# Patient Record
Sex: MALE | Race: WHITE | NOT HISPANIC OR LATINO | Employment: OTHER | URBAN - NONMETROPOLITAN AREA
[De-identification: names, ages, dates, MRNs, and addresses within clinical notes are randomized per-mention and may not be internally consistent; named-entity substitution may affect disease eponyms.]

---

## 2018-07-22 ENCOUNTER — OFFICE VISIT (OUTPATIENT)
Dept: URGENT CARE | Facility: PHYSICIAN GROUP | Age: 53
End: 2018-07-22

## 2018-07-22 VITALS
WEIGHT: 174 LBS | HEART RATE: 84 BPM | RESPIRATION RATE: 16 BRPM | TEMPERATURE: 98.7 F | OXYGEN SATURATION: 98 % | SYSTOLIC BLOOD PRESSURE: 118 MMHG | BODY MASS INDEX: 23.06 KG/M2 | DIASTOLIC BLOOD PRESSURE: 74 MMHG | HEIGHT: 73 IN

## 2018-07-22 DIAGNOSIS — S69.91XA FISH HOOK INJURY OF RIGHT THUMB, INITIAL ENCOUNTER: ICD-10-CM

## 2018-07-22 PROCEDURE — 90471 IMMUNIZATION ADMIN: CPT | Performed by: NURSE PRACTITIONER

## 2018-07-22 PROCEDURE — 90715 TDAP VACCINE 7 YRS/> IM: CPT | Performed by: NURSE PRACTITIONER

## 2018-07-22 PROCEDURE — 10121 INC&RMVL FB SUBQ TISS COMP: CPT | Performed by: NURSE PRACTITIONER

## 2018-07-22 NOTE — PATIENT INSTRUCTIONS
"Fish Hook Removal  A fish hook can cause a small cut or lesion that extends through all layers of the skin and into the subcutaneous tissue. Because of this, bacteria may get injected beneath the surface of the skin. A simple bandage (dressing) may be applied. This should be changed daily. Follow your caregiver's instructions regarding use of any antibacterial ointments.   Only take over-the-counter or prescription medicines for pain, discomfort, or fever as directed by your caregiver.  If you did not receive a tetanus shot from your caregiver because you did not recall when your last one was given, make sure to check with your physician's office and determine if one is needed. Generally for a \"dirty\" wound, you should receive a tetanus booster if you have not had one in the last five years. If you have a \"clean\" wound, you should receive a tetanus booster if you have not had one within the last ten years.  SEEK IMMEDIATE MEDICAL CARE IF:   · You develop redness, swelling, or increasing pain in the wound.  · You have a fever.  · You notice a bad smell coming from the wound or dressing.  · You notice pus or other unusual drainage coming from the wound.  Document Released: 12/15/2001 Document Revised: 03/11/2013 Document Reviewed: 07/08/2010  [employer]® Patient Information ©2014 [employer], [employer].    "

## 2018-07-24 ASSESSMENT — ENCOUNTER SYMPTOMS
CHILLS: 0
FEVER: 0
DIAPHORESIS: 0
WEAKNESS: 0
FOCAL WEAKNESS: 0
TINGLING: 0
SENSORY CHANGE: 0

## 2018-07-24 NOTE — PROGRESS NOTES
"Subjective:      Reinaldo Slater is a 53 y.o. male who presents with Foreign Body (R/ palm)            Patient comes in today with a single barbed hook from a fishhook in his right thumb.  He tried removal at home with no relief.  He is not up to date on tetanus vaccine.  Hook was relatively clean.  No bleeding or pain.          Review of Systems   Constitutional: Negative for chills, diaphoresis, fever and malaise/fatigue.   Musculoskeletal: Negative for joint pain.   Neurological: Negative for tingling, sensory change, focal weakness and weakness.     Medications, Allergies, and current problem list reviewed today in Epic     Objective:     /74   Pulse 84   Temp 37.1 °C (98.7 °F)   Resp 16   Ht 1.854 m (6' 1\")   Wt 78.9 kg (174 lb)   SpO2 98%   BMI 22.96 kg/m²      Physical Exam   Constitutional: He appears well-developed and well-nourished. No distress.   Cardiovascular: Normal rate.    Pulmonary/Chest: Effort normal.   Musculoskeletal:        Hands:  Fish hook as foreign body noted at the palmar aspect of the right thumb.  Patient cut the hook so only 3 mm is sticking out of the skin.  It was a small hook with small barbs.  Minimally TTP.  NO active bleeding.  NV intact. Sensation and ROM intact.  Cap refill < 2 seconds.  NO swelling or erythema.  Procedure: Foreign body removal  -Risks, benefits, and alternatives discussed. Risks including infection, bleeding, nerve damage, and poor cosmetic outcome  -Sterile technique throughout  -Local anesthesia with 2% lidocaine   -3 mm Incision with #11 blade at base of foreign body and extended proximally.    -Hook grasped with hemostat and easily removed.  Hook measures 6 mm with eros intact.  -Irrigated copiously with NS  -No evidence of further retained foreign body.  -Minimal bleeding with good hemostasis achieved  -The patient tolerated the procedure well     Skin: He is not diaphoretic.   Vitals reviewed.    In clinic medication: Tdap vaccine.  " Patient tolerated well.            Assessment/Plan:     1. Fish hook injury of right thumb, initial encounter  - Tdap =>6yo IM    Keep wound clean.    Follow up if any evidence of poor healing or secondary infection develop.  Patient verbalized understanding of and agreed with plan of care.

## 2018-10-01 ENCOUNTER — OFFICE VISIT (OUTPATIENT)
Dept: URGENT CARE | Facility: PHYSICIAN GROUP | Age: 53
End: 2018-10-01

## 2018-10-01 VITALS
HEART RATE: 82 BPM | WEIGHT: 177 LBS | OXYGEN SATURATION: 99 % | TEMPERATURE: 97.5 F | RESPIRATION RATE: 16 BRPM | DIASTOLIC BLOOD PRESSURE: 68 MMHG | HEIGHT: 73 IN | SYSTOLIC BLOOD PRESSURE: 112 MMHG | BODY MASS INDEX: 23.46 KG/M2

## 2018-10-01 DIAGNOSIS — R31.9 HEMATURIA, UNSPECIFIED TYPE: ICD-10-CM

## 2018-10-01 DIAGNOSIS — R21 PENILE RASH: ICD-10-CM

## 2018-10-01 LAB
APPEARANCE UR: CLEAR
BILIRUB UR STRIP-MCNC: NORMAL MG/DL
COLOR UR AUTO: YELLOW
GLUCOSE UR STRIP.AUTO-MCNC: NORMAL MG/DL
KETONES UR STRIP.AUTO-MCNC: NORMAL MG/DL
LEUKOCYTE ESTERASE UR QL STRIP.AUTO: NORMAL
NITRITE UR QL STRIP.AUTO: NORMAL
PH UR STRIP.AUTO: 5.5 [PH] (ref 5–8)
PROT UR QL STRIP: NORMAL MG/DL
RBC UR QL AUTO: NORMAL
SP GR UR STRIP.AUTO: 1.01
UROBILINOGEN UR STRIP-MCNC: NORMAL MG/DL

## 2018-10-01 PROCEDURE — 99214 OFFICE O/P EST MOD 30 MIN: CPT | Performed by: PHYSICIAN ASSISTANT

## 2018-10-01 PROCEDURE — 81002 URINALYSIS NONAUTO W/O SCOPE: CPT | Performed by: PHYSICIAN ASSISTANT

## 2018-10-01 RX ORDER — TRIAMCINOLONE ACETONIDE 5 MG/G
CREAM TOPICAL
Qty: 60 G | Refills: 0 | Status: SHIPPED | OUTPATIENT
Start: 2018-10-01 | End: 2020-05-19

## 2018-10-02 NOTE — PROGRESS NOTES
Chief Complaint   Patient presents with   • Testicle Swelling     notice it 3d ago       HISTORY OF PRESENT ILLNESS: Patient is a 53 y.o. male who presents today for the following:    Patient comes in for evaluation of a rash on the tip of his penis that started Friday morning, 3 days ago.  He denies pain but states it is very irritating.  He has sores that almost look like blisters on the tip of his penis.  He has noticed some irritation at the urethral opening but denies any penile drainage.  Patient does have a history of renal stones.  He denies back pain, abdominal pain, nausea, vomiting, and fevers.  He has been  for 13 years and does not suspect STDs.  Patient adds that he does not have health insurance and would like to keep cost down if possible.    Patient Active Problem List    Diagnosis Date Noted   • Kidney stones 10/08/2015   • Facial rash 10/08/2015   • Tobacco abuse 10/08/2015   • Rosacea 10/08/2015       Allergies:Patient has no known allergies.    Current Outpatient Prescriptions Ordered in [facility]   Medication Sig Dispense Refill   • triamcinolone acetonide (KENALOG) 0.5 % Cream Apply to affected area up to 4 times daily. Max use 14 days. 60 g 0   • minocycline (MINOCIN) 50 MG Cap TAKE ONE CAPSULE BY MOUTH TWICE DAILY 60 Cap 0   • fluticasone (FLONASE) 50 MCG/ACT nasal spray Spray 1 Spray in nose every day.     • albuterol (VENTOLIN OR PROVENTIL) 108 (90 BASE) MCG/ACT Aero Soln inhalation aerosol Inhale 2 Puffs by mouth every 6 hours as needed for Shortness of Breath. 8.5 g 3     No current [facility]-ordered facility-administered medications on file.        No past medical history on file.    Social History   Substance Use Topics   • Smoking status: Current Every Day Smoker     Packs/day: 0.50     Types: Cigarettes   • Smokeless tobacco: Never Used   • Alcohol use No       Family Status   Relation Status   • Mo Alive   • Fa Alive     Family History   Problem Relation Age of Onset   • Hyperlipidemia  "Father        Review of Systems:   Constitutional ROS: No unexpected change in weight, No weakness, No fatigue  Eye ROS: No recent significant change in vision, No eye pain, redness, discharge  Ear ROS: No drainage, No tinnitus or vertigo, No recent change in hearing  Mouth/Throat ROS: No teeth or gum problems, No bleeding gums, No tongue complaints  Neck ROS: No swollen glands, No significant pain in neck  Pulmonary ROS: No chronic cough, sputum, or hemoptysis, No dyspnea on exertion, No wheezing  Cardiovascular ROS: No diaphoresis, No edema, No palpitations  Gastrointestinal ROS: No change in bowel habits, No significant change in appetite, No nausea, vomiting, diarrhea, or constipation  Musculoskeletal/Extremities ROS: No peripheral edema, No pain, redness or swelling on the joints  Hematologic/Lymphatic ROS: No chills, No night sweats, No weight loss  Skin/Integumentary ROS: No edema, No evidence of rash, No itching      Exam:  Blood pressure 112/68, pulse 82, temperature 36.4 °C (97.5 °F), temperature source Temporal, resp. rate 16, height 1.854 m (6' 1\"), weight 80.3 kg (177 lb), SpO2 99 %.  General: Well developed, well nourished. No distress.  HEENT: Head is grossly normal.  Pulmonary: Unlabored respiratory effort.    Neurologic: Grossly nonfocal. No facial asymmetry noted.  : Penis is circumcised.  2 flesh-colored raised lesions approximately 1-2 mm in diameter on the head of the penis with crust centrally.  3-4 other flesh-colored, raised lesions approximately 1-2 mm in diameter that are not vesicular.  None of the lesions are tender to palpation.  The urethral opening is somewhat moist but without obvious drainage.  There are no lesions at the urethral opening.  No scrotal or testicular abnormalities noted.  Skin: Warm, dry, good turgor. No rashes in visible areas.   Psych: Normal mood. Alert and oriented x3. Judgment and insight is normal.    UA: Trace blood, otherwise " negative    Assessment/Plan:  Discussed with patient that while visual leave the lesions may resemble HSV, HSV is typically quite painful.  Discussed the pros and cons of viral testing both serum and viral culture from swab in the clinic, and patient would like to not do that testing at this time.  Also discussed unknown cost of GC/chlamydia testing from this clinic.  Patient states he would like to check out the local health department to see if testing is cheaper over there.  We will have patient try triamcinolone cream to see if this helps relieve his symptoms.  Also provided patient with some antibiotic ointment from the clinic to use should the symptoms persist.  Bottom line is patient understands to follow-up for any worsening or persistent symptoms.    Patient also understands to follow-up regarding the trace blood in his urine.  1. Penile rash  triamcinolone acetonide (KENALOG) 0.5 % Cream    POCT Urinalysis   2. Hematuria, unspecified type

## 2018-10-03 ENCOUNTER — OFFICE VISIT (OUTPATIENT)
Dept: MEDICAL GROUP | Facility: PHYSICIAN GROUP | Age: 53
End: 2018-10-03

## 2018-10-03 VITALS
TEMPERATURE: 98.5 F | OXYGEN SATURATION: 96 % | DIASTOLIC BLOOD PRESSURE: 78 MMHG | HEIGHT: 73 IN | HEART RATE: 78 BPM | SYSTOLIC BLOOD PRESSURE: 118 MMHG | WEIGHT: 170 LBS | RESPIRATION RATE: 16 BRPM | BODY MASS INDEX: 22.53 KG/M2

## 2018-10-03 DIAGNOSIS — R21 RASH: ICD-10-CM

## 2018-10-03 DIAGNOSIS — Z13.0 ENCOUNTER FOR SCREENING FOR HEMATOLOGIC DISORDER: ICD-10-CM

## 2018-10-03 DIAGNOSIS — Z13.6 SCREENING FOR CARDIOVASCULAR CONDITION: ICD-10-CM

## 2018-10-03 DIAGNOSIS — F17.200 NICOTINE DEPENDENCE WITH CURRENT USE: ICD-10-CM

## 2018-10-03 DIAGNOSIS — Z23 NEED FOR INFLUENZA VACCINATION: ICD-10-CM

## 2018-10-03 PROCEDURE — 90686 IIV4 VACC NO PRSV 0.5 ML IM: CPT | Performed by: NURSE PRACTITIONER

## 2018-10-03 PROCEDURE — 99214 OFFICE O/P EST MOD 30 MIN: CPT | Mod: 25 | Performed by: NURSE PRACTITIONER

## 2018-10-03 PROCEDURE — 90471 IMMUNIZATION ADMIN: CPT | Performed by: NURSE PRACTITIONER

## 2018-10-03 ASSESSMENT — PATIENT HEALTH QUESTIONNAIRE - PHQ9: CLINICAL INTERPRETATION OF PHQ2 SCORE: 0

## 2018-10-03 NOTE — PROGRESS NOTES
Chief Complaint   Patient presents with   • Rash     legs, swollen lymph nodes         This is a 53 y.o.male patient that presents today with the following: follow up rash    Rash  Pt seen a few days ago in  for vesicular rash on penis as well as irritation and redness at meatus. At the time declined testing due to cost and lack of insurance coverage. He is accompanied by wife today. Wife does not complain of s/sx's. He was given Rx for topical steroid, which has helped a bit with the irritation, but he developed redness to bilateral groin and enlarged and tender inguinal lymph nodes. However the redness in groin has resolved, but lymph nodes remain. Rash on head of penis is starting to heal and crust over, it is not painful or itchy. He would like to go ahead with testing, this was ordered for him, in addition to other routine fasting labs. He was advised to continue with the topical steroid if it is helping and he will be notified of lab results and any further actions if needed.    Nicotine dependence with current use  This is chronic. He smokes on a daily basis and understands the risks associate with tobacco use. He is not ready to quit, but will let me know when ready and ask for assistance with cessation.      Office Visit on 10/01/2018   Component Date Value   • POC Color 10/01/2018 Yellow    • POC Appearance 10/01/2018 Clear    • POC Leukocyte Esterase 10/01/2018 Neg    • POC Nitrites 10/01/2018 Neg    • POC Urobiligen 10/01/2018 Neg    • POC Protein 10/01/2018 Neg    • POC Urine PH 10/01/2018 5.5    • POC Blood 10/01/2018 Trace    • POC Specific Gravity 10/01/2018 1.010    • POC Ketones 10/01/2018 Neg    • POC Bilirubin 10/01/2018 Neg    • POC Glucose 10/01/2018 Neg          clinical course has been stable    No past medical history on file.    Past Surgical History:   Procedure Laterality Date   • SEPTOPLASTY     • SINUS WASHING         Family History   Problem Relation Age of Onset   • Hyperlipidemia  "Father        Patient has no known allergies.    Current Outpatient Prescriptions Ordered in [facility]   Medication Sig Dispense Refill   • triamcinolone acetonide (KENALOG) 0.5 % Cream Apply to affected area up to 4 times daily. Max use 14 days. 60 g 0   • minocycline (MINOCIN) 50 MG Cap TAKE ONE CAPSULE BY MOUTH TWICE DAILY 60 Cap 0   • fluticasone (FLONASE) 50 MCG/ACT nasal spray Spray 1 Spray in nose every day.     • albuterol (VENTOLIN OR PROVENTIL) 108 (90 BASE) MCG/ACT Aero Soln inhalation aerosol Inhale 2 Puffs by mouth every 6 hours as needed for Shortness of Breath. 8.5 g 3     No current [facility]-ordered facility-administered medications on file.        Constitutional ROS: No unexpected change in weight, No fatigue, No unexplained fevers, sweats, or chills  Pulmonary ROS: No chronic cough, sputum, or hemoptysis, No shortness of breath, No recent change in breathing, Positive for smoker   Cardiovascular ROS: No chest pain  Musculoskeletal/Extremities ROS: No clubbing, No peripheral edema, No pain, redness or swelling on the joints  Skin/Integumentary ROS: positive per HPI  Neurologic ROS: Normal development, No seizures, No weakness    Physical exam:  /78 (BP Location: Right arm, Patient Position: Sitting, BP Cuff Size: Adult)   Pulse 78   Temp 36.9 °C (98.5 °F) (Temporal)   Resp 16   Ht 1.854 m (6' 1\")   Wt 77.1 kg (170 lb)   SpO2 96%   BMI 22.43 kg/m²   General Appearance: middle aged male, alert, no distress, well nourished, well groomed  Skin: Skin color, texture, turgor normal. No rashes or lesions.  Lungs: negative findings: normal respiratory rate and rhythm, lungs clear to auscultation  Heart: negative. RRR without murmur, gallop, or rubs.  No ectopy.  Abdomen: Abdomen soft, non-tender. BS normal. No masses,  No organomegaly  Musculoskeletal: negative findings: no evidence of joint instability, strength normal, no deformities present  Neurologic: intact, CN 2-12 grossly intact  Genitalia: " positive findings: healing rash on head of penis, positive for bilateral inguinal lymphadenopathy    Medical decision making/discussion: will get labs include would culture. He is to follow up with me in 6-8 weeks. He can continue on steroid if it is helping with redness and irritation. Pt will get influenza immunization today    Reinaldo was seen today for rash.    Diagnoses and all orders for this visit:    Rash  -     CHLAMYDIA/GC PCR URINE OR SWAB; Future  -     RPR  -     HERPES VIRAL CULTURE  -     CBC WITH DIFFERENTIAL; Future    Nicotine dependence with current use    Need for influenza vaccination  -     Influenza Vaccine Quad Injection >3Y (PF)    Screening for cardiovascular condition  -     COMP METABOLIC PANEL; Future  -     LIPID PROFILE; Future    Encounter for screening for hematologic disorder  -     CBC WITH DIFFERENTIAL; Future          Please note that this dictation was created using voice recognition software. I have made every reasonable attempt to correct obvious errors, but I expect that there are errors of grammar and possibly content that I did not discover before finalizing the note.

## 2018-10-04 ENCOUNTER — HOSPITAL ENCOUNTER (OUTPATIENT)
Dept: LAB | Facility: MEDICAL CENTER | Age: 53
End: 2018-10-04
Attending: NURSE PRACTITIONER

## 2018-10-04 DIAGNOSIS — R21 RASH: ICD-10-CM

## 2018-10-04 DIAGNOSIS — Z13.0 ENCOUNTER FOR SCREENING FOR HEMATOLOGIC DISORDER: ICD-10-CM

## 2018-10-04 DIAGNOSIS — Z13.6 SCREENING FOR CARDIOVASCULAR CONDITION: ICD-10-CM

## 2018-10-04 LAB
ALBUMIN SERPL BCP-MCNC: 4.4 G/DL (ref 3.2–4.9)
ALBUMIN/GLOB SERPL: 1.8 G/DL
ALP SERPL-CCNC: 67 U/L (ref 30–99)
ALT SERPL-CCNC: 10 U/L (ref 2–50)
ANION GAP SERPL CALC-SCNC: 7 MMOL/L (ref 0–11.9)
AST SERPL-CCNC: 16 U/L (ref 12–45)
BASOPHILS # BLD AUTO: 0.4 % (ref 0–1.8)
BASOPHILS # BLD: 0.03 K/UL (ref 0–0.12)
BILIRUB SERPL-MCNC: 0.6 MG/DL (ref 0.1–1.5)
BUN SERPL-MCNC: 15 MG/DL (ref 8–22)
CALCIUM SERPL-MCNC: 9.5 MG/DL (ref 8.5–10.5)
CHLORIDE SERPL-SCNC: 108 MMOL/L (ref 96–112)
CHOLEST SERPL-MCNC: 138 MG/DL (ref 100–199)
CO2 SERPL-SCNC: 24 MMOL/L (ref 20–33)
CREAT SERPL-MCNC: 1.14 MG/DL (ref 0.5–1.4)
EOSINOPHIL # BLD AUTO: 0.14 K/UL (ref 0–0.51)
EOSINOPHIL NFR BLD: 2 % (ref 0–6.9)
ERYTHROCYTE [DISTWIDTH] IN BLOOD BY AUTOMATED COUNT: 42.5 FL (ref 35.9–50)
FASTING STATUS PATIENT QL REPORTED: NORMAL
GLOBULIN SER CALC-MCNC: 2.5 G/DL (ref 1.9–3.5)
GLUCOSE SERPL-MCNC: 114 MG/DL (ref 65–99)
HCT VFR BLD AUTO: 45 % (ref 42–52)
HDLC SERPL-MCNC: 37 MG/DL
HGB BLD-MCNC: 15.3 G/DL (ref 14–18)
IMM GRANULOCYTES # BLD AUTO: 0.01 K/UL (ref 0–0.11)
IMM GRANULOCYTES NFR BLD AUTO: 0.1 % (ref 0–0.9)
LDLC SERPL CALC-MCNC: 90 MG/DL
LYMPHOCYTES # BLD AUTO: 1.95 K/UL (ref 1–4.8)
LYMPHOCYTES NFR BLD: 28.3 % (ref 22–41)
MCH RBC QN AUTO: 31 PG (ref 27–33)
MCHC RBC AUTO-ENTMCNC: 34 G/DL (ref 33.7–35.3)
MCV RBC AUTO: 91.3 FL (ref 81.4–97.8)
MONOCYTES # BLD AUTO: 0.49 K/UL (ref 0–0.85)
MONOCYTES NFR BLD AUTO: 7.1 % (ref 0–13.4)
NEUTROPHILS # BLD AUTO: 4.28 K/UL (ref 1.82–7.42)
NEUTROPHILS NFR BLD: 62.1 % (ref 44–72)
NRBC # BLD AUTO: 0 K/UL
NRBC BLD-RTO: 0 /100 WBC
PLATELET # BLD AUTO: 238 K/UL (ref 164–446)
PMV BLD AUTO: 9.6 FL (ref 9–12.9)
POTASSIUM SERPL-SCNC: 4.4 MMOL/L (ref 3.6–5.5)
PROT SERPL-MCNC: 6.9 G/DL (ref 6–8.2)
RBC # BLD AUTO: 4.93 M/UL (ref 4.7–6.1)
SODIUM SERPL-SCNC: 139 MMOL/L (ref 135–145)
TRIGL SERPL-MCNC: 57 MG/DL (ref 0–149)
WBC # BLD AUTO: 6.9 K/UL (ref 4.8–10.8)

## 2018-10-04 PROCEDURE — 87491 CHLMYD TRACH DNA AMP PROBE: CPT

## 2018-10-04 PROCEDURE — 85025 COMPLETE CBC W/AUTO DIFF WBC: CPT

## 2018-10-04 PROCEDURE — 86780 TREPONEMA PALLIDUM: CPT

## 2018-10-04 PROCEDURE — 36415 COLL VENOUS BLD VENIPUNCTURE: CPT

## 2018-10-04 PROCEDURE — 87591 N.GONORRHOEAE DNA AMP PROB: CPT

## 2018-10-04 PROCEDURE — 80053 COMPREHEN METABOLIC PANEL: CPT

## 2018-10-04 PROCEDURE — 80061 LIPID PANEL: CPT

## 2018-10-04 NOTE — ASSESSMENT & PLAN NOTE
Pt seen a few days ago in  for vesicular rash on penis as well as irritation and redness at meatus. At the time declined testing due to cost and lack of insurance coverage. He is accompanied by wife today. Wife does not complain of s/sx's. He was given Rx for topical steroid, which has helped a bit with the irritation, but he developed redness to bilateral groin and enlarged and tender inguinal lymph nodes. However the redness in groin has resolved, but lymph nodes remain. Rash on head of penis is starting to heal and crust over, it is not painful or itchy. He would like to go ahead with testing, this was ordered for him, in addition to other routine fasting labs. He was advised to continue with the topical steroid if it is helping and he will be notified of lab results and any further actions if needed.

## 2018-10-04 NOTE — ASSESSMENT & PLAN NOTE
This is chronic. He smokes on a daily basis and understands the risks associate with tobacco use. He is not ready to quit, but will let me know when ready and ask for assistance with cessation.

## 2018-10-05 LAB
C TRACH DNA SPEC QL NAA+PROBE: NEGATIVE
N GONORRHOEA DNA SPEC QL NAA+PROBE: NEGATIVE
SPECIMEN SOURCE: NORMAL
TREPONEMA PALLIDUM IGG+IGM AB [PRESENCE] IN SERUM OR PLASMA BY IMMUNOASSAY: NON REACTIVE

## 2020-05-19 ENCOUNTER — OFFICE VISIT (OUTPATIENT)
Dept: URGENT CARE | Facility: PHYSICIAN GROUP | Age: 55
End: 2020-05-19

## 2020-05-19 VITALS
HEIGHT: 74 IN | RESPIRATION RATE: 16 BRPM | BODY MASS INDEX: 22.46 KG/M2 | TEMPERATURE: 98 F | HEART RATE: 78 BPM | SYSTOLIC BLOOD PRESSURE: 140 MMHG | OXYGEN SATURATION: 97 % | WEIGHT: 175 LBS | DIASTOLIC BLOOD PRESSURE: 70 MMHG

## 2020-05-19 DIAGNOSIS — S39.012A ACUTE MYOFASCIAL STRAIN OF LUMBAR REGION, INITIAL ENCOUNTER: ICD-10-CM

## 2020-05-19 PROCEDURE — 99214 OFFICE O/P EST MOD 30 MIN: CPT | Performed by: PHYSICIAN ASSISTANT

## 2020-05-19 RX ORDER — PREDNISONE 10 MG/1
TABLET ORAL
Qty: 1 QUANTITY SUFFICIENT | Refills: 0 | Status: SHIPPED | OUTPATIENT
Start: 2020-05-19

## 2020-05-19 RX ORDER — TIZANIDINE 4 MG/1
4 TABLET ORAL EVERY 6 HOURS PRN
Qty: 30 TAB | Refills: 0 | Status: SHIPPED | OUTPATIENT
Start: 2020-05-19

## 2020-05-19 ASSESSMENT — FIBROSIS 4 INDEX: FIB4 SCORE: 1.17

## 2020-05-19 NOTE — PROGRESS NOTES
Chief Complaint   Patient presents with   • Back Pain     Pt states he sneezed and immediately felt pain in lower back.       HISTORY OF PRESENT ILLNESS: Patient is a 55 y.o. male who presents today for the following:    Patient comes in for evaluation of low back pain on the right side started a few days ago.  He states he sneezed and felt immediate pain in his right low back.  He reports mild numbness and aching on the posterior lateral aspect of his right upper leg but denies any saddle anesthesia, bowel/bladder incontinence, and extremity weakness.  He reports history of the same years ago.  He has been taking some ibuprofen with little bit of relief.  He has worsening pain when trying to stand up straight from a seated position.    Patient Active Problem List    Diagnosis Date Noted   • Rash 10/03/2018   • Kidney stones 10/08/2015   • Facial rash 10/08/2015   • Nicotine dependence with current use 10/08/2015   • Rosacea 10/08/2015       Allergies:Patient has no known allergies.    Current Outpatient Medications Ordered in Epic   Medication Sig Dispense Refill   • predniSONE (DELTASONE) 10 MG Tab 50 mg x 1 day; 40 mg x 1 day; 30 mg x 1 day; 20 mg x 1 day; 10 mg x 1 day 1 Quantity Sufficient 0   • tizanidine (ZANAFLEX) 4 MG Tab Take 1 Tab by mouth every 6 hours as needed (pain). MUSCLE RELAXER 30 Tab 0     No current Epic-ordered facility-administered medications on file.        History reviewed. No pertinent past medical history.    Social History     Tobacco Use   • Smoking status: Current Every Day Smoker     Packs/day: 0.50     Types: Cigarettes   • Smokeless tobacco: Never Used   Substance Use Topics   • Alcohol use: No   • Drug use: Not on file     Comment: Former addict       Family Status   Relation Name Status   • Mo  Alive   • Fa  Alive     Family History   Problem Relation Age of Onset   • Hyperlipidemia Father        Review of Systems:   Constitutional ROS: No unexpected change in weight, No weakness,  "No fatigue  Pulmonary ROS: No chronic cough, sputum, or hemoptysis, No dyspnea on exertion, No wheezing  Cardiovascular ROS: No diaphoresis, No edema, No palpitations  Musculoskeletal/Extremities ROS: Right-sided low back pain.  Hematologic/Lymphatic ROS: No chills, No night sweats, No weight loss  Skin/Integumentary ROS: No edema, No evidence of rash, No itching      Exam:  /70   Pulse 78   Temp 36.7 °C (98 °F) (Temporal)   Resp 16   Ht 1.88 m (6' 2\")   Wt 79.4 kg (175 lb)   SpO2 97%   General: Well developed, well nourished. No distress.    HENT: Head is grossly normal.  Pulmonary: Unlabored respiratory effort.   Neurologic: Grossly nonfocal. No facial asymmetry noted.  Musculoskeletal: No localized tenderness noted in the low back.  No motor or sensory deficit noted in the lower extremities.  Strong plantar flexion/dorsiflexion.  Skin: Warm, dry, good turgor. No rashes in visible areas.   Psych: Normal mood. Alert and oriented to person, place and time.    Assessment/Plan:  No spinal cord compromise noted on exam.  Use all medication as directed.  Discussed red flags and ER precautions.  Discussed appropriate over-the-counter symptomatic medication, and when to return to clinic. Follow up for worsening or persistent symptoms.  1. Acute myofascial strain of lumbar region, initial encounter  predniSONE (DELTASONE) 10 MG Tab    tizanidine (ZANAFLEX) 4 MG Tab       "

## 2020-06-10 DIAGNOSIS — G89.29 CHRONIC BACK PAIN, UNSPECIFIED BACK LOCATION, UNSPECIFIED BACK PAIN LATERALITY: ICD-10-CM

## 2020-06-10 DIAGNOSIS — M54.9 CHRONIC BACK PAIN, UNSPECIFIED BACK LOCATION, UNSPECIFIED BACK PAIN LATERALITY: ICD-10-CM

## 2020-09-08 ENCOUNTER — OFFICE VISIT (OUTPATIENT)
Dept: URGENT CARE | Facility: PHYSICIAN GROUP | Age: 55
End: 2020-09-08

## 2020-09-08 ENCOUNTER — APPOINTMENT (OUTPATIENT)
Dept: RADIOLOGY | Facility: IMAGING CENTER | Age: 55
End: 2020-09-08
Attending: PHYSICIAN ASSISTANT

## 2020-09-08 VITALS
OXYGEN SATURATION: 97 % | RESPIRATION RATE: 16 BRPM | TEMPERATURE: 98.4 F | DIASTOLIC BLOOD PRESSURE: 88 MMHG | HEART RATE: 54 BPM | BODY MASS INDEX: 22.72 KG/M2 | HEIGHT: 74 IN | SYSTOLIC BLOOD PRESSURE: 138 MMHG | WEIGHT: 177 LBS

## 2020-09-08 DIAGNOSIS — R07.81 RIB PAIN: ICD-10-CM

## 2020-09-08 DIAGNOSIS — S20.212A RIB CONTUSION, LEFT, INITIAL ENCOUNTER: ICD-10-CM

## 2020-09-08 PROCEDURE — 71101 X-RAY EXAM UNILAT RIBS/CHEST: CPT | Mod: TC,FY | Performed by: PHYSICIAN ASSISTANT

## 2020-09-08 PROCEDURE — 99214 OFFICE O/P EST MOD 30 MIN: CPT | Performed by: PHYSICIAN ASSISTANT

## 2020-09-08 ASSESSMENT — ENCOUNTER SYMPTOMS
NECK PAIN: 0
FEVER: 0
CHILLS: 0
SENSORY CHANGE: 0
WHEEZING: 0
HEMOPTYSIS: 0
FOCAL WEAKNESS: 0
DIARRHEA: 0
FALLS: 0
BACK PAIN: 0
NAUSEA: 0
SHORTNESS OF BREATH: 0
VOMITING: 0
TINGLING: 0
COUGH: 0
ABDOMINAL PAIN: 0

## 2020-09-08 ASSESSMENT — FIBROSIS 4 INDEX: FIB4 SCORE: 1.17

## 2020-09-08 NOTE — PROGRESS NOTES
Subjective:   Reinaldo Slater is a 55 y.o. male who presents for Rib Injury (Pt states he slipped and fell onto his ribs, states he is now having pain with deep inhalation/movement. DOI [id number])      Rib Injury  Pertinent negatives include no abdominal pain, chest pain, chills, coughing, fever, nausea, neck pain or vomiting.   Patient is a 55-year-old male who presents with left lower rib injury on 09/03/2020.  He states he was standing on the tire of his truck and slipped and his left lower ribs struck the edge of the truck bed.  He sustained very mild and minimal pain immediately after.  Pain had been gradually worsening over the weekend.  Pain is worse with movement and sudden coughing or sneezing.  He states he is able to take a slow deep breaths without pain.  He has tried some ice and ibuprofen with minimal relief.  Laying down and certain positions exacerbate his pain as well.  He reports of mild improvement of his pain today.  He otherwise denies any other symptoms.  Denies any fevers, chills, coughing, hemoptysis, shortness of breath, wheezes, chest pain, abdominal pain, nausea, vomiting, hematuria, bruising, back pain.  Denies any other injuries.  He has no other complaints or concerns.    Review of Systems   Constitutional: Negative for chills and fever.   HENT: Negative.    Respiratory: Negative for cough, hemoptysis, shortness of breath and wheezing.    Cardiovascular: Negative for chest pain.   Gastrointestinal: Negative for abdominal pain, diarrhea, nausea and vomiting.   Genitourinary: Negative for hematuria.   Musculoskeletal: Negative for back pain, falls and neck pain.        Left lower rib pain.   Neurological: Negative for tingling, sensory change and focal weakness.       Medications:    • predniSONE Tabs  • tizanidine Tabs    Allergies: Patient has no known allergies.    Problem List: Reinaldo Slater has Kidney stones; Facial rash; Nicotine dependence with current use;  "Rosacea; and Rash on their problem list.    Surgical History:  Past Surgical History:   Procedure Laterality Date   • SEPTOPLASTY     • SINUS WASHING         Past Social Hx: Reinaldo Slater  reports that he has been smoking cigarettes. He has been smoking about 0.50 packs per day. He has never used smokeless tobacco. He reports that he does not drink alcohol.     Past Family Hx:  Reinaldo Slater family history includes Hyperlipidemia in his father.     Problem list, medications, and allergies reviewed by myself today in Epic.     Objective:     /88   Pulse (!) 54   Temp 36.9 °C (98.4 °F) (Temporal)   Resp 16   Ht 1.88 m (6' 2\")   Wt 80.3 kg (177 lb)   SpO2 97%   BMI 22.73 kg/m²     Physical Exam  Vitals signs reviewed.   Constitutional:       General: He is not in acute distress.     Appearance: Normal appearance. He is not ill-appearing or toxic-appearing.   HENT:      Head: Normocephalic and atraumatic.   Eyes:      Conjunctiva/sclera: Conjunctivae normal.      Pupils: Pupils are equal, round, and reactive to light.   Cardiovascular:      Rate and Rhythm: Normal rate and regular rhythm.      Heart sounds: Normal heart sounds.   Pulmonary:      Effort: Pulmonary effort is normal. No respiratory distress.      Breath sounds: Normal breath sounds. No wheezing, rhonchi or rales.   Abdominal:      General: Abdomen is flat. Bowel sounds are normal. There is no distension.      Palpations: Abdomen is soft. There is no splenomegaly or mass.      Tenderness: There is no abdominal tenderness. There is no right CVA tenderness, left CVA tenderness, guarding or rebound. Negative signs include Smith's sign, Rovsing's sign and McBurney's sign.      Comments: Negative ecchymosis.   Musculoskeletal:        Arms:       Comments: Area above: Tenderness to palpation focally to anterior lateral left lower ribs.   Negative crepitus  Negative erythema or ecchymosis  Negative deformity     Skin:     " General: Skin is warm and dry.      Findings: No bruising.   Neurological:      General: No focal deficit present.      Mental Status: He is alert and oriented to person, place, and time.   Psychiatric:         Mood and Affect: Mood normal.         Behavior: Behavior normal.       DX: Left Ribs with 1 view chest   COMPARISON: NONE     FINDINGS:  LUNGS: The lungs are clear.     HEART and MEDIASTINUM: Heart and mediastinum: normal in size.     Pleura: There are no pleural effusion or pneumothoraces.     Osseous structures: No significant bony abnormality.        IMPRESSION:     No cardiopulmonary abnormality or left rib fracture identified    Assessment/Plan:     Diagnosis and associated orders:     1. Rib contusion, left, initial encounter  [id number] (WITH 1-VIEW CXR) LEFT      Comments/MDM:     • Results per radiologist interpretation above. I agree with radiologist.   • Discussed with patient most likely a rib contusion and/or chest wall strain.   • Will treat similar to rib fracture.   • Deep breathing exercises, rest, elevation, ice application, ibuprofen 600 to 800 mg every 8 hours alternating with Tylenol, avoid exacerbating factors, positional changes, and avoid strenuous physical activity.       Red flags discussed and indications to immediately call 911 or present to the Emergency Department.   Supportive care, differential diagnoses, and indications for immediate follow-up discussed with patient.    Pathogenesis of diagnosis discussed including typical length and natural progression. Patient expresses understanding and agrees to plan.    Advised the patient to follow-up with the primary care physician for recheck, reevaluation, and consideration of further management.    Please note that this dictation was created using voice recognition software. I have made a reasonable attempt to correct obvious errors, but I expect that there are errors of grammar and possibly content that I did not discover  before finalizing the note.    This note was electronically signed by Prasanth Cardona PA-C

## 2020-09-09 NOTE — PATIENT INSTRUCTIONS
Rib Contusion  A rib contusion is a deep bruise on your rib area. Contusions are the result of a blunt trauma that causes bleeding and injury to the tissues under the skin. A rib contusion may involve bruising of the ribs and of the skin and muscles in the area. The skin over the contusion may turn blue, purple, or yellow. Minor injuries will give you a painless contusion. More severe contusions may stay painful and swollen for a few weeks.  What are the causes?  This condition is usually caused by a blow, trauma, or direct force to an area of the body. This often occurs while playing contact sports.  What are the signs or symptoms?  Symptoms of this condition include:  · Swelling and redness of the injured area.  · Discoloration of the injured area.  · Tenderness and soreness of the injured area.  · Pain with or without movement.  How is this diagnosed?  This condition may be diagnosed based on:  · Your symptoms and medical history.  · A physical exam.  · Imaging tests--such as an X-ray, CT scan, or MRI--to determine if there were internal injuries or broken bones (fractures).  How is this treated?  This condition may be treated with:  · Rest. This is often the best treatment for a rib contusion.  · Icing. This reduces swelling and inflammation.  · Deep-breathing exercises. These may be recommended to reduce the risk for lung collapse and pneumonia.  · Medicines. Over-the-counter or prescription medicines may be given to control pain.  · Injection of a numbing medicine around the nerve near your injury (nerve block).  Follow these instructions at home:         Medicines  · Take over-the-counter and prescription medicines only as told by your health care provider.  · Do not drive or use heavy machinery while taking prescription pain medicine.  · If you are taking prescription pain medicine, take actions to prevent or treat constipation. Your health care provider may recommend that you:  ? Drink enough fluid to keep  your urine pale yellow.  ? Eat foods that are high in fiber, such as fresh fruits and vegetables, whole grains, and beans.  ? Limit foods that are high in fat and processed sugars, such as fried or sweet foods.  ? Take an over-the-counter or prescription medicine for constipation.  Managing pain, stiffness, and swelling  · If directed, put ice on the injured area:  ? Put ice in a plastic bag.  ? Place a towel between your skin and the bag.  ? Leave the ice on for 20 minutes, 2-3 times a day.  · Rest the injured area. Avoid strenuous activity and any activities or movements that cause pain. Be careful during activities and avoid bumping the injured area.  · Do not lift anything that is heavier than 5 lb (2.3 kg), or the limit that you are told, until your health care provider says that it is safe.  General instructions  · Do not use any products that contain nicotine or tobacco, such as cigarettes and e-cigarettes. These can delay healing. If you need help quitting, ask your health care provider.  · Do deep-breathing exercises as told by your health care provider.  · If you were given an incentive spirometer, use it every 1-2 hours while you are awake, or as recommended by your health care provider. This device measures how well you are filling your lungs with each breath.  · Keep all follow-up visits as told by your health care provider. This is important.  Contact a health care provider if you have:  · Increased bruising or swelling.  · Pain that is not controlled with treatment.  · A fever.  Get help right away if you:  · Have difficulty breathing or shortness of breath.  · Develop a continual cough or you cough up thick or bloody sputum.  · Feel nauseous or you vomit.  · Have pain in your abdomen.  Summary  · A rib contusion is a deep bruise on your rib area. Contusions are the result of a blunt trauma that causes bleeding and injury to the tissues under the skin.  · The skin overlying the contusion may turn  blue, purple, or yellow. Minor injuries may give you a painless contusion. More severe contusions may stay painful and swollen for a few weeks.  · Rest the injured area. Avoid strenuous activity and any activities or movements that cause pain.  This information is not intended to replace advice given to you by your health care provider. Make sure you discuss any questions you have with your health care provider.  Document Released: 09/12/2002 Document Revised: 01/16/2019 Document Reviewed: 01/16/2019  Elsevier Patient Education © 2020 [employer]

## 2025-02-03 ENCOUNTER — OFFICE VISIT (OUTPATIENT)
Dept: URGENT CARE | Facility: PHYSICIAN GROUP | Age: 60
End: 2025-02-03

## 2025-02-03 ENCOUNTER — HOSPITAL ENCOUNTER (OUTPATIENT)
Facility: MEDICAL CENTER | Age: 60
End: 2025-02-03
Attending: FAMILY MEDICINE

## 2025-02-03 VITALS
WEIGHT: 180 LBS | TEMPERATURE: 97.5 F | SYSTOLIC BLOOD PRESSURE: 130 MMHG | DIASTOLIC BLOOD PRESSURE: 84 MMHG | RESPIRATION RATE: 14 BRPM | OXYGEN SATURATION: 97 % | HEART RATE: 98 BPM | BODY MASS INDEX: 23.11 KG/M2

## 2025-02-03 DIAGNOSIS — N48.9 PENILE LESION: ICD-10-CM

## 2025-02-03 LAB — AMBIGUOUS DTTM AMBI4: NORMAL

## 2025-02-03 PROCEDURE — 99204 OFFICE O/P NEW MOD 45 MIN: CPT | Performed by: FAMILY MEDICINE

## 2025-02-03 PROCEDURE — 3079F DIAST BP 80-89 MM HG: CPT | Performed by: FAMILY MEDICINE

## 2025-02-03 PROCEDURE — 87798 DETECT AGENT NOS DNA AMP: CPT

## 2025-02-03 PROCEDURE — 87529 HSV DNA AMP PROBE: CPT | Mod: 91

## 2025-02-03 PROCEDURE — 3075F SYST BP GE 130 - 139MM HG: CPT | Performed by: FAMILY MEDICINE

## 2025-02-04 ENCOUNTER — HOSPITAL ENCOUNTER (OUTPATIENT)
Dept: LAB | Facility: MEDICAL CENTER | Age: 60
End: 2025-02-04
Attending: FAMILY MEDICINE

## 2025-02-04 DIAGNOSIS — N48.9 PENILE LESION: ICD-10-CM

## 2025-02-04 PROCEDURE — 86780 TREPONEMA PALLIDUM: CPT

## 2025-02-04 PROCEDURE — 36415 COLL VENOUS BLD VENIPUNCTURE: CPT

## 2025-02-04 NOTE — PROGRESS NOTES
Subjective:      Chief Complaint   Patient presents with    Penis Pain     X1DAY        Rash      C/o lesion to head of penis x 1 d.   + mildly painful.            No d/c    + sexually active wife only            No past medical history on file.      Social History     Tobacco Use    Smoking status: Every Day     Current packs/day: 0.50     Types: Cigarettes    Smokeless tobacco: Never   Vaping Use    Vaping status: Never Used   Substance Use Topics    Alcohol use: No         No Known Allergies        Family History   Problem Relation Age of Onset    Hyperlipidemia Father          Review of Systems   Constitutional: Negative for fever and fatigue.   HENT: Negative for sore throat.    Respiratory: Negative for cough and shortness of breath.    Skin: Positive for rash.   All other systems reviewed and are negative.         Objective:   /84   Pulse 98   Temp 36.4 °C (97.5 °F) (Temporal)   Resp 14   Wt 81.6 kg (180 lb)   SpO2 97%       Physical Exam   Constitutional: pt is oriented to person, place, and time. Pt appears well-developed and well-nourished. No distress.   HENT:   Head: Normocephalic and atraumatic.   Mouth/Throat: Oropharynx is clear and moist and mucous membranes are normal.   Eyes: Conjunctivae are normal.   Cardiovascular: Normal rate, regular rhythm and normal heart sounds.    Pulmonary/Chest: Effort normal and breath sounds normal. No respiratory distress. She has no wheezes.   Neurological: pt is alert and oriented to person, place, and time. No cranial nerve deficit.    :   normal male phallus.   There is a small scab at the head of penis, but no ulceration, no erythema and area is not TTP.    No d/c.      Psychiatric: pt's behavior is normal.   Nursing note and vitals reviewed.              Assessment/Plan:      1. Penile lesion   Unclear etiology.        No vesicles or erythema to suggest herpes.    +  painful, so unlikely syphilis      Will culture  Will start on  valtrex      -RPR     - Referral to Urology  - HERPES VIRAL CULTURE     Differential diagnosis, natural history, supportive care, and indications for immediate follow-up discussed. All questions answered. Patient agrees with the plan of care.     Follow-up as needed if symptoms worsen or fail to improve to PCP, Urgent care or Emergency Room.     I have personally reviewed prior external notes and test results pertinent to today's visit.  I have independently reviewed and interpreted all diagnostics ordered during this urgent care acute visit.

## 2025-02-05 LAB — T PALLIDUM AB SER QL IA: NORMAL

## 2025-02-06 LAB
HSV1 DNA CSF QL NAA+PROBE: DETECTED
HSV2 DNA CSF QL NAA+PROBE: NOT DETECTED
SPECIMEN SOURCE: ABNORMAL
SPECIMEN SOURCE: NORMAL
VZV DNA SPEC QL NAA+PROBE: NOT DETECTED

## 2025-02-07 RX ORDER — VALACYCLOVIR HYDROCHLORIDE 1 G/1
1000 TABLET, FILM COATED ORAL 2 TIMES DAILY
Qty: 14 TABLET | Refills: 0 | Status: SHIPPED | OUTPATIENT
Start: 2025-02-07 | End: 2025-02-14

## 2025-03-07 ENCOUNTER — OFFICE VISIT (OUTPATIENT)
Dept: URGENT CARE | Facility: PHYSICIAN GROUP | Age: 60
End: 2025-03-07

## 2025-03-07 VITALS
RESPIRATION RATE: 20 BRPM | WEIGHT: 76 LBS | BODY MASS INDEX: 10.07 KG/M2 | HEIGHT: 73 IN | HEART RATE: 91 BPM | SYSTOLIC BLOOD PRESSURE: 118 MMHG | TEMPERATURE: 99.1 F | DIASTOLIC BLOOD PRESSURE: 64 MMHG | OXYGEN SATURATION: 97 %

## 2025-03-07 DIAGNOSIS — J22 LRTI (LOWER RESPIRATORY TRACT INFECTION): ICD-10-CM

## 2025-03-07 PROCEDURE — 3074F SYST BP LT 130 MM HG: CPT | Performed by: PHYSICIAN ASSISTANT

## 2025-03-07 PROCEDURE — 99214 OFFICE O/P EST MOD 30 MIN: CPT | Performed by: PHYSICIAN ASSISTANT

## 2025-03-07 PROCEDURE — 3078F DIAST BP <80 MM HG: CPT | Performed by: PHYSICIAN ASSISTANT

## 2025-03-07 RX ORDER — PREDNISONE 20 MG/1
TABLET ORAL
Qty: 10 TABLET | Refills: 0 | Status: SHIPPED | OUTPATIENT
Start: 2025-03-07

## 2025-03-07 RX ORDER — DEXTROMETHORPHAN HYDROBROMIDE AND PROMETHAZINE HYDROCHLORIDE 15; 6.25 MG/5ML; MG/5ML
5 SYRUP ORAL 3 TIMES DAILY PRN
Qty: 120 ML | Refills: 0 | Status: SHIPPED | OUTPATIENT
Start: 2025-03-07

## 2025-03-07 RX ORDER — AZITHROMYCIN 250 MG/1
TABLET, FILM COATED ORAL
Qty: 6 TABLET | Refills: 0 | Status: SHIPPED | OUTPATIENT
Start: 2025-03-07

## 2025-03-07 ASSESSMENT — ENCOUNTER SYMPTOMS
SORE THROAT: 0
CARDIOVASCULAR NEGATIVE: 1
MYALGIAS: 0
SHORTNESS OF BREATH: 0
FEVER: 1
SPUTUM PRODUCTION: 1
DIZZINESS: 0
NAUSEA: 0
HEADACHES: 1
RHINORRHEA: 1
WHEEZING: 0
DIARRHEA: 0
ABDOMINAL PAIN: 0
COUGH: 1
VOMITING: 0

## 2025-03-07 NOTE — PROGRESS NOTES
Subjective     Reinaldo Slater is a very pleasant 60 y.o. male who presents with Fever (X 3 days ) and Cough (Coughing up green mucus x 9 days pt concerned for bronchitis )            Cough  This is a new problem. The current episode started 1 to 4 weeks ago. The problem has been gradually worsening. The problem occurs every few minutes. The cough is Productive of sputum. Associated symptoms include a fever, headaches, nasal congestion, postnasal drip and rhinorrhea. Pertinent negatives include no chest pain, ear congestion, ear pain, myalgias, sore throat, shortness of breath or wheezing. Risk factors for lung disease include smoking/tobacco exposure. He has tried OTC cough suppressant for the symptoms. The treatment provided mild relief. His past medical history is significant for bronchitis.         PMH:  has no past medical history of Asthma.  MEDS:   Current Outpatient Medications:     promethazine-dextromethorphan (PROMETHAZINE-DM) 6.25-15 MG/5ML syrup, Take 5 mL by mouth 3 times a day as needed for Cough., Disp: 120 mL, Rfl: 0    predniSONE (DELTASONE) 20 MG Tab, Take 2 tabs p.o. daily x 5 days, Disp: 10 Tablet, Rfl: 0    azithromycin (ZITHROMAX) 250 MG Tab, Z-jonnie, Use as directed., Disp: 6 Tablet, Rfl: 0  ALLERGIES: No Known Allergies  SURGHX:   Past Surgical History:   Procedure Laterality Date    SEPTOPLASTY      SINUS WASHING       SOCHX:  reports that he has been smoking cigarettes. He has never used smokeless tobacco. He reports that he does not drink alcohol.  FH: family history includes Hyperlipidemia in his father.        Review of Systems   Constitutional:  Positive for fever and malaise/fatigue.   HENT:  Positive for congestion, postnasal drip and rhinorrhea. Negative for ear pain and sore throat.    Respiratory:  Positive for cough and sputum production. Negative for shortness of breath and wheezing.    Cardiovascular: Negative.  Negative for chest pain.   Gastrointestinal:  Negative for  "abdominal pain, diarrhea, nausea and vomiting.   Musculoskeletal:  Negative for myalgias.   Neurological:  Positive for headaches. Negative for dizziness.       Medications, Allergies, and current problem list reviewed today in Epic           Objective     /64   Pulse 91   Temp 37.3 °C (99.1 °F) (Temporal)   Resp 20   Ht 1.854 m (6' 1\")   Wt 34.5 kg (76 lb)   SpO2 97%   BMI 10.03 kg/m²      Physical Exam  Vitals and nursing note reviewed.   Constitutional:       General: He is not in acute distress.     Appearance: Normal appearance. He is well-developed. He is not ill-appearing, toxic-appearing or diaphoretic.   HENT:      Head: Normocephalic and atraumatic.      Right Ear: Tympanic membrane, ear canal and external ear normal.      Left Ear: Tympanic membrane, ear canal and external ear normal.      Nose: Congestion and rhinorrhea present.      Mouth/Throat:      Mouth: Mucous membranes are moist.      Pharynx: Oropharynx is clear. No oropharyngeal exudate or posterior oropharyngeal erythema.   Eyes:      General:         Right eye: No discharge.         Left eye: No discharge.      Conjunctiva/sclera: Conjunctivae normal.   Cardiovascular:      Rate and Rhythm: Normal rate and regular rhythm.      Pulses: Normal pulses.      Heart sounds: Normal heart sounds. No murmur heard.  Pulmonary:      Effort: Pulmonary effort is normal. No respiratory distress.      Breath sounds: Normal breath sounds. No stridor. No wheezing, rhonchi or rales.      Comments: Productive deep painful bronchial cough  Chest:      Chest wall: No tenderness.   Musculoskeletal:         General: No swelling or tenderness.      Cervical back: Normal range of motion and neck supple.      Right lower leg: No edema.      Left lower leg: No edema.   Lymphadenopathy:      Cervical: No cervical adenopathy.   Skin:     General: Skin is warm and dry.   Neurological:      General: No focal deficit present.      Mental Status: He is alert and " oriented to person, place, and time. Mental status is at baseline.   Psychiatric:         Mood and Affect: Mood normal.         Behavior: Behavior normal.         Thought Content: Thought content normal.         Judgment: Judgment normal.                                  Assessment & Plan  LRTI (lower respiratory tract infection)  This is a very pleasant 60-year-old male presenting with 2 weeks of cough congestion and fatigue.  Intermittent fever chills and bodyaches.  pO2 is adequate and lungs are clear without wheezing rhonchi rales or stridor.  He does have URI symptoms and a painful productive bronchial cough.  Treating for bacterial etiology based on duration of symptoms.      Orders:    promethazine-dextromethorphan (PROMETHAZINE-DM) 6.25-15 MG/5ML syrup; Take 5 mL by mouth 3 times a day as needed for Cough.    predniSONE (DELTASONE) 20 MG Tab; Take 2 tabs p.o. daily x 5 days    azithromycin (ZITHROMAX) 250 MG Tab; Z-jonnie, Use as directed.           I personally reviewed prior external notes and test results pertinent to today's visit. Return to clinic or go to ED if symptoms worsen or persist. Red flag symptoms and indications for ED discussed at length. Patient/Parent/Guardian voices understanding.  AVS with post-visit instructions printed and provided or given verbally.  Follow-up with your primary care provider in 3-5 days. All side effects and potential interactions of prescribed medication discussed including allergic response, GI upset, tendon injury, rash, sedation, OCP effectiveness, etc.    Please note that this dictation was created using voice recognition software. I have made every reasonable attempt to correct obvious errors, but I expect that there are errors of grammar and possibly content that I did not discover before finalizing the note.